# Patient Record
(demographics unavailable — no encounter records)

---

## 2019-08-15 NOTE — RAD
AP view the pelvis and lateral view of the left hip

 

Clinical indications: Left hip pain

 

FINDINGS: No acute fracture or dislocation or lytic process is seen. 

Calcification of the gluteal tendon is seen just superior to the greater 

trochanter of the left hip and is also seen on the right side. No 

significant arthritic change is seen otherwise. This may be seen with 

calcific tendinitis and/or myositis ossificans. The hip joints are 

symmetric. Multiple calcified fibroids are seen within the pelvis. The 

largest measures 5.5 cm.

 

IMPRESSION: No acute fracture. Calcific tendinitis or myositis ossificans.

 

Electronically signed by: Yo Sood MD (8/15/2019 1:14 PM) [email or web address]